# Patient Record
Sex: MALE | Race: OTHER | NOT HISPANIC OR LATINO
[De-identification: names, ages, dates, MRNs, and addresses within clinical notes are randomized per-mention and may not be internally consistent; named-entity substitution may affect disease eponyms.]

---

## 2022-08-24 ENCOUNTER — APPOINTMENT (OUTPATIENT)
Dept: INTERNAL MEDICINE | Facility: CLINIC | Age: 47
End: 2022-08-24

## 2022-08-24 ENCOUNTER — LABORATORY RESULT (OUTPATIENT)
Age: 47
End: 2022-08-24

## 2022-08-24 VITALS
TEMPERATURE: 97.8 F | HEIGHT: 71.4 IN | OXYGEN SATURATION: 98 % | SYSTOLIC BLOOD PRESSURE: 120 MMHG | BODY MASS INDEX: 27.28 KG/M2 | DIASTOLIC BLOOD PRESSURE: 84 MMHG | HEART RATE: 68 BPM | WEIGHT: 197 LBS

## 2022-08-24 DIAGNOSIS — Z72.89 OTHER PROBLEMS RELATED TO LIFESTYLE: ICD-10-CM

## 2022-08-24 DIAGNOSIS — Z82.49 FAMILY HISTORY OF ISCHEMIC HEART DISEASE AND OTHER DISEASES OF THE CIRCULATORY SYSTEM: ICD-10-CM

## 2022-08-24 DIAGNOSIS — Z00.00 ENCOUNTER FOR GENERAL ADULT MEDICAL EXAMINATION W/OUT ABNORMAL FINDINGS: ICD-10-CM

## 2022-08-24 DIAGNOSIS — Z78.9 OTHER SPECIFIED HEALTH STATUS: ICD-10-CM

## 2022-08-24 DIAGNOSIS — B35.1 TINEA UNGUIUM: ICD-10-CM

## 2022-08-24 DIAGNOSIS — Z87.891 PERSONAL HISTORY OF NICOTINE DEPENDENCE: ICD-10-CM

## 2022-08-24 PROCEDURE — 36415 COLL VENOUS BLD VENIPUNCTURE: CPT

## 2022-08-24 PROCEDURE — 99386 PREV VISIT NEW AGE 40-64: CPT

## 2022-08-24 PROCEDURE — 93000 ELECTROCARDIOGRAM COMPLETE: CPT

## 2022-08-25 ENCOUNTER — NON-APPOINTMENT (OUTPATIENT)
Age: 47
End: 2022-08-25

## 2022-08-28 NOTE — HISTORY OF PRESENT ILLNESS
[de-identified] : This is a 46 yo M\par \par since covid 2020  gets recurrent palpitations. - random - has improved. - no assoc dizziness\par when he wakes - up - cough- congestion -  - has been improving\par mother   afib valve replaced, \par \par about to have right inguinal hernia surgery- Dr Gregory Dakin  Fax: [phone]\par \par quit tob 5 yrs ago - was 1/2PPD to ppd 17 to 42\par  - clean drugs 5 years\par drinks etoh -  - 0 - 5 glasses weekday more weekends\par diet is ok\par during covid gained 40lbs but then lost 20lbs.  \par runs regularly\par \par covid vax x3\par  over ten years since last tetanus. \par had one monnkeypox dose.

## 2022-08-28 NOTE — PLAN
[FreeTextEntry1] : wellness complete\par labs today\par  EKG NSR\par For surgery - check labs \par \par with hx of palpitations  cardiology referral  \par gi referral for colonoscopy\par consider LDCT at age 55\par reviewed need to cut back on etoh

## 2022-09-14 ENCOUNTER — OUTPATIENT (OUTPATIENT)
Dept: OUTPATIENT SERVICES | Facility: HOSPITAL | Age: 47
LOS: 1 days | End: 2022-09-14
Payer: COMMERCIAL

## 2022-09-14 ENCOUNTER — APPOINTMENT (OUTPATIENT)
Dept: ULTRASOUND IMAGING | Facility: HOSPITAL | Age: 47
End: 2022-09-14

## 2022-09-14 PROCEDURE — 76705 ECHO EXAM OF ABDOMEN: CPT | Mod: 26

## 2022-09-14 PROCEDURE — 76705 ECHO EXAM OF ABDOMEN: CPT

## 2022-09-19 ENCOUNTER — NON-APPOINTMENT (OUTPATIENT)
Age: 47
End: 2022-09-19

## 2022-09-19 LAB
ALBUMIN SERPL ELPH-MCNC: 4.8 G/DL
ALP BLD-CCNC: 94 U/L
ALT SERPL-CCNC: 168 U/L
AST SERPL-CCNC: 92 U/L
BILIRUB DIRECT SERPL-MCNC: 0.2 MG/DL
BILIRUB INDIRECT SERPL-MCNC: 0.5 MG/DL
BILIRUB SERPL-MCNC: 0.7 MG/DL
FERRITIN SERPL-MCNC: 449 NG/ML
HBV SURFACE AB SER QL: NONREACTIVE
HBV SURFACE AG SER QL: NONREACTIVE
IRON SATN MFR SERPL: 26 %
IRON SERPL-MCNC: 102 UG/DL
PROT SERPL-MCNC: 7.2 G/DL
TIBC SERPL-MCNC: 388 UG/DL
UIBC SERPL-MCNC: 286 UG/DL

## 2023-03-09 ENCOUNTER — NON-APPOINTMENT (OUTPATIENT)
Age: 48
End: 2023-03-09

## 2023-03-09 ENCOUNTER — APPOINTMENT (OUTPATIENT)
Dept: HEART AND VASCULAR | Facility: CLINIC | Age: 48
End: 2023-03-09
Payer: MEDICAID

## 2023-03-09 VITALS
BODY MASS INDEX: 28.52 KG/M2 | DIASTOLIC BLOOD PRESSURE: 82 MMHG | WEIGHT: 206 LBS | SYSTOLIC BLOOD PRESSURE: 134 MMHG | OXYGEN SATURATION: 97 % | HEIGHT: 71.4 IN | TEMPERATURE: 98.3 F | HEART RATE: 62 BPM

## 2023-03-09 PROCEDURE — 93000 ELECTROCARDIOGRAM COMPLETE: CPT

## 2023-03-09 NOTE — HISTORY OF PRESENT ILLNESS
[FreeTextEntry1] : sent by pcp for cardiac eval\par c/o on off episodes of palpiations\par noted sob at time\par no cp

## 2023-03-09 NOTE — DISCUSSION/SUMMARY
[FreeTextEntry1] : stable exam and ecg\par palpiations/sob\par check zio patch r/o arrhythmia\par f/u for est and echo eval\par  [EKG obtained to assist in diagnosis and management of assessed problem(s)] : EKG obtained to assist in diagnosis and management of assessed problem(s)

## 2023-03-09 NOTE — PHYSICAL EXAM
elizabethist   [Well Developed] : well developed [Well Nourished] : well nourished [No Acute Distress] : no acute distress [Normal Conjunctiva] : normal conjunctiva [Normal Venous Pressure] : normal venous pressure [No Carotid Bruit] : no carotid bruit [Normal S1, S2] : normal S1, S2 [No Murmur] : no murmur [No Rub] : no rub [No Gallop] : no gallop [Clear Lung Fields] : clear lung fields [Good Air Entry] : good air entry [No Respiratory Distress] : no respiratory distress  [Soft] : abdomen soft [Non Tender] : non-tender [No Masses/organomegaly] : no masses/organomegaly [Normal Bowel Sounds] : normal bowel sounds [Normal Gait] : normal gait [No Edema] : no edema [No Cyanosis] : no cyanosis [No Clubbing] : no clubbing [No Rash] : no rash [Moves all extremities] : moves all extremities [No Focal Deficits] : no focal deficits [Normal Speech] : normal speech [Alert and Oriented] : alert and oriented [Normal memory] : normal memory

## 2023-03-30 ENCOUNTER — APPOINTMENT (OUTPATIENT)
Dept: HEART AND VASCULAR | Facility: CLINIC | Age: 48
End: 2023-03-30
Payer: MEDICAID

## 2023-03-30 VITALS
HEIGHT: 71 IN | WEIGHT: 201 LBS | DIASTOLIC BLOOD PRESSURE: 80 MMHG | BODY MASS INDEX: 28.14 KG/M2 | HEART RATE: 71 BPM | OXYGEN SATURATION: 95 % | SYSTOLIC BLOOD PRESSURE: 120 MMHG | TEMPERATURE: 98 F

## 2023-03-30 DIAGNOSIS — R06.02 SHORTNESS OF BREATH: ICD-10-CM

## 2023-03-30 DIAGNOSIS — R00.2 PALPITATIONS: ICD-10-CM

## 2023-03-30 PROCEDURE — 93306 TTE W/DOPPLER COMPLETE: CPT

## 2023-03-30 PROCEDURE — 93015 CV STRESS TEST SUPVJ I&R: CPT

## 2023-03-30 PROCEDURE — 99214 OFFICE O/P EST MOD 30 MIN: CPT | Mod: 25

## 2023-03-30 NOTE — DISCUSSION/SUMMARY
[FreeTextEntry1] : stable exam\par sob/palp\par negative non invasive cardiac evaluation\par results discussed/ reassurance given\par holter reviewed with patient'\par discussed continued risk modification

## 2023-04-27 ENCOUNTER — APPOINTMENT (OUTPATIENT)
Dept: GASTROENTEROLOGY | Facility: CLINIC | Age: 48
End: 2023-04-27
Payer: MEDICAID

## 2023-04-27 VITALS
HEART RATE: 73 BPM | OXYGEN SATURATION: 97 % | BODY MASS INDEX: 29.68 KG/M2 | WEIGHT: 205 LBS | HEIGHT: 69.5 IN | SYSTOLIC BLOOD PRESSURE: 129 MMHG | DIASTOLIC BLOOD PRESSURE: 83 MMHG | TEMPERATURE: 97.3 F | RESPIRATION RATE: 16 BRPM

## 2023-04-27 DIAGNOSIS — R09.89 OTHER SPECIFIED SYMPTOMS AND SIGNS INVOLVING THE CIRCULATORY AND RESPIRATORY SYSTEMS: ICD-10-CM

## 2023-04-27 DIAGNOSIS — R79.89 OTHER SPECIFIED ABNORMAL FINDINGS OF BLOOD CHEMISTRY: ICD-10-CM

## 2023-04-27 DIAGNOSIS — Z12.11 ENCOUNTER FOR SCREENING FOR MALIGNANT NEOPLASM OF COLON: ICD-10-CM

## 2023-04-27 DIAGNOSIS — K76.0 FATTY (CHANGE OF) LIVER, NOT ELSEWHERE CLASSIFIED: ICD-10-CM

## 2023-04-27 PROCEDURE — 99203 OFFICE O/P NEW LOW 30 MIN: CPT

## 2023-04-27 RX ORDER — PANTOPRAZOLE 40 MG/1
40 TABLET, DELAYED RELEASE ORAL
Qty: 60 | Refills: 0 | Status: ACTIVE | COMMUNITY
Start: 2023-04-27 | End: 1900-01-01

## 2023-04-27 RX ORDER — POLYETHYLENE GLYCOL 3350 AND ELECTROLYTES WITH LEMON FLAVOR 236; 22.74; 6.74; 5.86; 2.97 G/4L; G/4L; G/4L; G/4L; G/4L
236 POWDER, FOR SOLUTION ORAL
Qty: 1 | Refills: 0 | Status: ACTIVE | COMMUNITY
Start: 2023-04-27 | End: 1900-01-01

## 2023-04-27 NOTE — PHYSICAL EXAM
[Normal] : alert, normal voice/communication, healthy appearing, no acute distress [No Respiratory Distress] : no respiratory distress [Respiration, Rhythm And Depth] : normal respiratory rhythm and effort [Heart Rate And Rhythm] : heart rate was normal and rhythm regular [Heart Sounds Gallop] : no gallops [Bowel Sounds] : normal bowel sounds [Abdomen Tenderness] : non-tender [No Masses] : no abdominal mass palpated [Abdomen Soft] : soft

## 2023-04-30 NOTE — ASSESSMENT
[FreeTextEntry1] : 46 yo M former smoker with globus sensation x 1 year, due for index screening colonoscopy also found to have elevated liver enzymes and hepatic steatosis on imaging\par \par #Globus sensation:  Further eval with EGD warranted given risk factors for Barretts esophagus and to r/o esophageal explanation for sx\par \par -Empiric protonix 40 mg daily for now\par -Plan for EGD. R/B/I reviewed\par \par #Screening colonoscopy: Due for index screening colonoscopy.  R/B/I discussed\par \par -Golytely prep ordered\par -Will schedule at [facility]\par \par #Elevated liver enzymes: Steatosis on imaging. Neg Hep B/Hep C\par FIB4 1.68 and further eval with fibroscan warranted\par \par -Hepatology referral for comprehensive eval and fibroscan\par -Counseled on importance of weight loss and ETOH cessation\par \par

## 2023-04-30 NOTE — HISTORY OF PRESENT ILLNESS
[FreeTextEntry1] : 46 yo M former smoker referred today for evaluation of abnormal sensation in throat in AM for last 1 year.\par \par Describes gradual onset of sx. Feels that he needs to clear throat in AM. Initially was more noticeable and bothersome though sx resolved over the course of an hour or so.  No associated coughing. No persistent dysphagia, or odynophagia but occasionally feels tenderness in throat when swallowing.  No weight loss.\par \par Describes prior hx of GERD which felt more like pain in chest which improved with OTC PPI.  Has not tried any therapy to alleviate sensation in throat.  Feels sx occur all year round and no clear association with PND or allergic rhinitis. \par \par >20 pack year smoker and quit several years ago.  Social ETOH use, drinks 3x/week and on weekends but has more significant ETOH use hx before being counseled to cut back by PCP after elevated liver enzymes noted with steatosis on RUQUS\par \par No prior colonoscopy and denies abd pain, diarrhea or constipation. No melena/hematochezia. \par \par No famil hx of GI disease/malignancy.

## 2023-04-30 NOTE — CONSULT LETTER
[Dear  ___] : Dear  [unfilled], [Consult Letter:] : I had the pleasure of evaluating your patient, [unfilled]. [Please see my note below.] : Please see my note below. [Sincerely,] : Sincerely, [Consult Closing:] : Thank you very much for allowing me to participate in the care of this patient.  If you have any questions, please do not hesitate to contact me. [FreeTextEntry3] : Ang Harrison MD\par Professor of Medicine\par Chief of GI\par Director IBD Program\par [facility]\par

## 2023-06-07 ENCOUNTER — APPOINTMENT (OUTPATIENT)
Age: 48
End: 2023-06-07
Payer: MEDICAID

## 2023-06-07 ENCOUNTER — RESULT REVIEW (OUTPATIENT)
Age: 48
End: 2023-06-07

## 2023-06-07 PROCEDURE — 43239 EGD BIOPSY SINGLE/MULTIPLE: CPT

## 2023-06-07 PROCEDURE — 45380 COLONOSCOPY AND BIOPSY: CPT

## 2023-06-16 ENCOUNTER — NON-APPOINTMENT (OUTPATIENT)
Age: 48
End: 2023-06-16